# Patient Record
Sex: MALE | Race: OTHER | HISPANIC OR LATINO | URBAN - METROPOLITAN AREA
[De-identification: names, ages, dates, MRNs, and addresses within clinical notes are randomized per-mention and may not be internally consistent; named-entity substitution may affect disease eponyms.]

---

## 2017-01-13 ENCOUNTER — EMERGENCY (EMERGENCY)
Facility: HOSPITAL | Age: 27
LOS: 1 days | Discharge: HOME | End: 2017-01-13
Attending: EMERGENCY MEDICINE
Payer: COMMERCIAL

## 2017-01-13 VITALS
DIASTOLIC BLOOD PRESSURE: 75 MMHG | SYSTOLIC BLOOD PRESSURE: 136 MMHG | WEIGHT: 197.98 LBS | RESPIRATION RATE: 16 BRPM | HEIGHT: 67 IN | HEART RATE: 88 BPM | OXYGEN SATURATION: 98 % | TEMPERATURE: 98 F

## 2017-01-13 DIAGNOSIS — J09.X2 INFLUENZA DUE TO IDENTIFIED NOVEL INFLUENZA A VIRUS WITH OTHER RESPIRATORY MANIFESTATIONS: ICD-10-CM

## 2017-01-13 PROCEDURE — 99282 EMERGENCY DEPT VISIT SF MDM: CPT

## 2017-01-13 PROCEDURE — 99283 EMERGENCY DEPT VISIT LOW MDM: CPT

## 2017-01-13 RX ORDER — IBUPROFEN 200 MG
1 TABLET ORAL
Qty: 9 | Refills: 0 | OUTPATIENT
Start: 2017-01-13 | End: 2017-01-16

## 2017-01-13 NOTE — ED ADULT TRIAGE NOTE - CHIEF COMPLAINT QUOTE
Pt send by urgent care for evaluation for high fever positive influenza A test and reports neck pain, stiffness and equivocal meningeal signs per MD referral to ED.

## 2017-01-13 NOTE — ED PROVIDER NOTE - NS ED MD SCRIBE ATTENDING SCRIBE SECTIONS
PAST MEDICAL/SURGICAL/SOCIAL HISTORY/REVIEW OF SYSTEMS/VITAL SIGNS( Pullset)/DISPOSITION/PHYSICAL EXAM/HIV/HISTORY OF PRESENT ILLNESS

## 2017-01-13 NOTE — ED PROVIDER NOTE - MEDICAL DECISION MAKING DETAILS
Pt with fever, cough and body aches presents to the ED with positive flu result at urgent to r/o meningitis. Pt with no neck pain and full range of motion of neck. Plan to d/c home with PMD f/u. Pt to return in he develops shortness of breath.

## 2017-01-13 NOTE — ED PROVIDER NOTE - OBJECTIVE STATEMENT
27 y/o M pt with no PMHx presents to the ED c/o fever (100.8F) x 3 days. Pt also notes cough and body aches.  Pt reports visiting urgent care who referred him to the ED for possible meningitis due to neck pain. Pt notes neck pain has resolved. Pt denies nausea, vomiting, abdominal pain or any other complaints at this time. NKDA.
